# Patient Record
Sex: MALE | Race: OTHER | HISPANIC OR LATINO | URBAN - METROPOLITAN AREA
[De-identification: names, ages, dates, MRNs, and addresses within clinical notes are randomized per-mention and may not be internally consistent; named-entity substitution may affect disease eponyms.]

---

## 2018-02-22 ENCOUNTER — EMERGENCY (EMERGENCY)
Facility: HOSPITAL | Age: 38
LOS: 1 days | Discharge: HOME | End: 2018-02-22
Attending: EMERGENCY MEDICINE | Admitting: EMERGENCY MEDICINE
Payer: OTHER MISCELLANEOUS

## 2018-02-22 VITALS
TEMPERATURE: 98 F | OXYGEN SATURATION: 96 % | SYSTOLIC BLOOD PRESSURE: 124 MMHG | RESPIRATION RATE: 16 BRPM | HEART RATE: 61 BPM | DIASTOLIC BLOOD PRESSURE: 73 MMHG

## 2018-02-22 VITALS
TEMPERATURE: 98 F | DIASTOLIC BLOOD PRESSURE: 80 MMHG | SYSTOLIC BLOOD PRESSURE: 154 MMHG | HEART RATE: 72 BPM | RESPIRATION RATE: 16 BRPM | OXYGEN SATURATION: 99 %
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
## 2018-02-22 DIAGNOSIS — Z23 ENCOUNTER FOR IMMUNIZATION: ICD-10-CM

## 2018-02-22 DIAGNOSIS — S01.81XA LACERATION WITHOUT FOREIGN BODY OF OTHER PART OF HEAD, INITIAL ENCOUNTER: ICD-10-CM
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
## 2018-02-22 PROCEDURE — 12013 RPR F/E/E/N/L/M 2.6-5.0 CM: CPT

## 2018-02-22 PROCEDURE — 99283 EMERGENCY DEPT VISIT LOW MDM: CPT | Mod: 25

## 2018-02-22 RX ORDER — CEPHALEXIN 500 MG
500 CAPSULE ORAL ONCE
Qty: 0 | Refills: 0 | Status: COMPLETED | OUTPATIENT
Start: 2018-02-22 | End: 2018-02-22

## 2018-02-22 RX ORDER — CEPHALEXIN 500 MG
1 CAPSULE ORAL
Qty: 28 | Refills: 0 | OUTPATIENT
Start: 2018-02-22 | End: 2018-02-28

## 2018-02-22 RX ORDER — ACETAMINOPHEN 500 MG
975 TABLET ORAL ONCE
Qty: 0 | Refills: 0 | Status: COMPLETED | OUTPATIENT
Start: 2018-02-22 | End: 2018-02-22

## 2018-02-22 RX ORDER — TETANUS TOXOID, REDUCED DIPHTHERIA TOXOID AND ACELLULAR PERTUSSIS VACCINE, ADSORBED 5; 2.5; 8; 8; 2.5 [IU]/.5ML; [IU]/.5ML; UG/.5ML; UG/.5ML; UG/.5ML
0.5 SUSPENSION INTRAMUSCULAR ONCE
Qty: 0 | Refills: 0 | Status: COMPLETED | OUTPATIENT
Start: 2018-02-22 | End: 2018-02-22

## 2018-02-22 RX ORDER — TRAMADOL HYDROCHLORIDE 50 MG/1
50 TABLET ORAL ONCE
Qty: 0 | Refills: 0 | Status: DISCONTINUED | OUTPATIENT
Start: 2018-02-22 | End: 2018-02-22

## 2018-02-22 RX ORDER — IBUPROFEN 200 MG
1 TABLET ORAL
Qty: 30 | Refills: 0 | OUTPATIENT
Start: 2018-02-22

## 2018-02-22 RX ADMIN — Medication 500 MILLIGRAM(S): at 13:04

## 2018-02-22 RX ADMIN — TRAMADOL HYDROCHLORIDE 50 MILLIGRAM(S): 50 TABLET ORAL at 09:57

## 2018-02-22 RX ADMIN — TETANUS TOXOID, REDUCED DIPHTHERIA TOXOID AND ACELLULAR PERTUSSIS VACCINE, ADSORBED 0.5 MILLILITER(S): 5; 2.5; 8; 8; 2.5 SUSPENSION INTRAMUSCULAR at 13:03

## 2018-02-22 RX ADMIN — TRAMADOL HYDROCHLORIDE 50 MILLIGRAM(S): 50 TABLET ORAL at 13:06

## 2018-02-22 RX ADMIN — Medication 975 MILLIGRAM(S): at 09:57

## 2018-02-22 NOTE — ED PROVIDER NOTE - CONSTITUTIONAL, MLM
normal... Well appearing, well nourished, awake, alert, oriented to person, place, time/situation and holding abdo pad to face

## 2018-02-22 NOTE — ED PROVIDER NOTE - MEDICAL DECISION MAKING DETAILS
Patient presenting with facial lacerations. Td UTD. will need layered closure/flap alignment. Patient presenting with facial lacerations. Td UTD. Will need layered closure/flap alignment- plastics to close deep lac.
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
## 2018-02-22 NOTE — ED PROVIDER NOTE - PROGRESS NOTE DETAILS
Patient now thinks he was confused about his Td. will give booster. Dr. wilkes of plastics closed deep laceration.

## 2018-02-22 NOTE — ED PROVIDER NOTE - SKIN, MLM
~ 1.5 x 1.5 cm ve shaoped chin lac, ~ 3-4mm deep, ~ 7 cm flap like lac ~ 5mm deep extending from L chin to L cheek. N/V intact. No salivary glands exposed.

## 2019-01-14 VITALS
HEIGHT: 65 IN | SYSTOLIC BLOOD PRESSURE: 146 MMHG | RESPIRATION RATE: 16 BRPM | OXYGEN SATURATION: 97 % | TEMPERATURE: 97 F | DIASTOLIC BLOOD PRESSURE: 76 MMHG | WEIGHT: 153.88 LBS | HEART RATE: 64 BPM

## 2019-01-15 ENCOUNTER — INPATIENT (INPATIENT)
Facility: HOSPITAL | Age: 39
LOS: 0 days | Discharge: HOME | DRG: 473 | End: 2019-01-16
Attending: ORTHOPAEDIC SURGERY | Admitting: ORTHOPAEDIC SURGERY
Payer: OTHER MISCELLANEOUS

## 2019-01-15 DIAGNOSIS — M54.12 RADICULOPATHY, CERVICAL REGION: ICD-10-CM

## 2019-01-15 RX ORDER — HYDROMORPHONE HYDROCHLORIDE 2 MG/ML
0.5 INJECTION INTRAMUSCULAR; INTRAVENOUS; SUBCUTANEOUS
Qty: 0 | Refills: 0 | Status: DISCONTINUED | OUTPATIENT
Start: 2019-01-15 | End: 2019-01-16

## 2019-01-15 RX ORDER — OXYCODONE HYDROCHLORIDE 5 MG/1
10 TABLET ORAL EVERY 4 HOURS
Qty: 0 | Refills: 0 | Status: DISCONTINUED | OUTPATIENT
Start: 2019-01-15 | End: 2019-01-16

## 2019-01-15 RX ORDER — OXYCODONE HYDROCHLORIDE 5 MG/1
5 TABLET ORAL EVERY 4 HOURS
Qty: 0 | Refills: 0 | Status: DISCONTINUED | OUTPATIENT
Start: 2019-01-15 | End: 2019-01-16

## 2019-01-15 RX ORDER — FAMOTIDINE 10 MG/ML
20 INJECTION INTRAVENOUS EVERY 12 HOURS
Qty: 0 | Refills: 0 | Status: DISCONTINUED | OUTPATIENT
Start: 2019-01-15 | End: 2019-01-16

## 2019-01-15 RX ORDER — CEFAZOLIN SODIUM 1 G
2000 VIAL (EA) INJECTION EVERY 8 HOURS
Qty: 0 | Refills: 0 | Status: COMPLETED | OUTPATIENT
Start: 2019-01-15 | End: 2019-01-16

## 2019-01-15 RX ORDER — SODIUM CHLORIDE 9 MG/ML
1000 INJECTION, SOLUTION INTRAVENOUS
Qty: 0 | Refills: 0 | Status: DISCONTINUED | OUTPATIENT
Start: 2019-01-15 | End: 2019-01-16

## 2019-01-15 RX ORDER — CEFAZOLIN SODIUM 1 G
2000 VIAL (EA) INJECTION EVERY 8 HOURS
Qty: 0 | Refills: 0 | Status: DISCONTINUED | OUTPATIENT
Start: 2019-01-15 | End: 2019-01-15

## 2019-01-15 RX ORDER — DEXAMETHASONE 0.5 MG/5ML
10 ELIXIR ORAL EVERY 8 HOURS
Qty: 0 | Refills: 0 | Status: COMPLETED | OUTPATIENT
Start: 2019-01-15 | End: 2019-01-16

## 2019-01-15 RX ORDER — ACETAMINOPHEN 500 MG
650 TABLET ORAL EVERY 6 HOURS
Qty: 0 | Refills: 0 | Status: DISCONTINUED | OUTPATIENT
Start: 2019-01-15 | End: 2019-01-16

## 2019-01-15 RX ADMIN — HYDROMORPHONE HYDROCHLORIDE 0.5 MILLIGRAM(S): 2 INJECTION INTRAMUSCULAR; INTRAVENOUS; SUBCUTANEOUS at 17:40

## 2019-01-15 RX ADMIN — FAMOTIDINE 20 MILLIGRAM(S): 10 INJECTION INTRAVENOUS at 21:45

## 2019-01-15 RX ADMIN — HYDROMORPHONE HYDROCHLORIDE 0.5 MILLIGRAM(S): 2 INJECTION INTRAMUSCULAR; INTRAVENOUS; SUBCUTANEOUS at 17:12

## 2019-01-15 RX ADMIN — Medication 2000 MILLIGRAM(S): at 21:45

## 2019-01-15 RX ADMIN — OXYCODONE HYDROCHLORIDE 10 MILLIGRAM(S): 5 TABLET ORAL at 21:44

## 2019-01-15 RX ADMIN — OXYCODONE HYDROCHLORIDE 10 MILLIGRAM(S): 5 TABLET ORAL at 22:40

## 2019-01-15 NOTE — PROGRESS NOTE ADULT - SUBJECTIVE AND OBJECTIVE BOX
Pain Management Consult Note - [facility] [phone]    Chief Complaint: Neck Pain    HPI: Patient seen and examined today, patient PREOP, scheduled for ACDF C5-C6. Patient complains of neck pain that radiates down his R shoulder and arm, patient expresses some tingling to R arm and R hand. Reviewed post op pain medication regimen with patient, patient verbalized understanding      Pain is ___ sharp __x__dull ___burning _x__achy ___ Intensity: ____ mild _x__mod _x__severe     Location ____surgical site __x__cervical _____lumbar ____abd ____upper ext____lower ext    Worse with __x__activity _x___movement _____physical therapy___ Rest    Improved with __x__medication __x__rest ____physical therapy      ROS: Const:  -___febrile   Eyes:___ENT:___CV: __-_chest pain  Resp: __-__sob  GI:_-__nausea _-__vomiting -___abd pain _x__npo ___clears __full diet __bm  :___ Musk: _x__pain __x_spasm  Skin:___ Neuro:  _-__wbftmpet_-__lhklyfocx_k__ numbness _-__weakness __x_paresth  Psych:_-_anxiety  Endo:___ Heme:___Allergy:_________, _x__all others reviewed and negative      PAST MEDICAL & SURGICAL HISTORY:  Cervical radiculopathy  History of kidney stones  No pertinent past medical history  No significant past surgical history      SH: _-__Tobacco   __-_Alcohol                          FH:FAMILY HISTORY:          T(C): --  HR: --  BP: --  RR: --  SpO2: --  Wt(kg): --    T(C): --  HR: --  BP: --  RR: --  SpO2: --  Wt(kg): --    T(C): --  HR: --  BP: --  RR: --  SpO2: --  Wt(kg): --    PHYSICAL EXAM:  Gen Appearance: ___no acute distress _x__appropriate        Neuro: ___SILT feet____ EOM Intact Psych: AAOX_3_, _x_mood/affect appropriate        Eyes: _x__conjunctiva WNL  __x___ Pupils equal and round        ENT: _x__ears and nose atraumatic__x_ Hearing grossly intact        Neck: x___trachea midline, no visible masses ___thyroid without palpable mass    Resp: _x__Nml WOB____No tactile fremitus ___clear to auscultation    Cardio: x___extremities free from edema __x__pedal pulses palpable    GI/Abdomen: _x__soft ___x__ Nontender___x___Nondistended_____HSM    Lymphatic: ___no palpable nodes in neck  ___no palpable nodes calves and feet    Skin/Wound: ___Incision, ___Dressing c/d/i,   ____surrounding tissues soft,  ___drain/chest tube present____    Muscular: EHL _5__/5  Gastrocnemius_5__/5    ___absent clubbing/cyanosis      ASSESSMENT: This is a 38y old Male with a history of cervical radiculopathy, scheuled for ACDF C5-C6.      Recommended Treatment PLAN:  1. Oxycodone 5-10mg PO Q4h prn moderate to severe pain   2. Dilaudid 0.5mg Q2h IVP prn breakthrough pain   Plan discussed with Dr. Justine Engel

## 2019-01-15 NOTE — H&P ADULT - NSHPPHYSICALEXAM_GEN_ALL_CORE
MSK:   Decreased ROM cervical spine secondary to pain.  Remainder of physical exam as per medical clearance note MSK: No rashes, lesions or open wounds noted. Skin warm and well perfused. 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 Sensation intact to bilateral upper extremities although diminished to right upper extremity compared to left upper extremity. Firining biceps/triceps muscles although strength decreased secondary to pain. PIN/AIN/ulnar nerves intact. Decreased ROM cervical spine secondary to pain.  Remainder of physical exam as per medical clearance note

## 2019-01-15 NOTE — H&P ADULT - NSHPREVIEWOFSYSTEMS_GEN_ALL_CORE
MSK: +neck pain, cervical spine  See HPI MSK: +neck pain, cervical spine: +bilateral arm pain   See HPI

## 2019-01-15 NOTE — PROGRESS NOTE ADULT - SUBJECTIVE AND OBJECTIVE BOX
Ortho Post Op Check    Procedure: ACDF C5-C6  Surgeon: Taylor     Pt comfortable without complaints, pain controlled, numbness improving in RUE postoperatively  Denies CP, SOB, N/V, tingling     Vital Signs Last 24 Hrs  T(C): 36.1 (01-15-19 @ 18:00), Max: 36.4 (01-15-19 @ 16:40)  T(F): 97 (01-15-19 @ 18:00), Max: 97.5 (01-15-19 @ 16:40)  HR: 80 (01-15-19 @ 20:00) (66 - 80)  BP: 135/87 (01-15-19 @ 20:00) (135/87 - 152/88)  BP(mean): 108 (01-15-19 @ 19:00) (94 - 108)  RR: 20 (01-15-19 @ 20:00) (10 - 20)  SpO2: 97% (01-15-19 @ 20:00) (97% - 99%)  AVSS    General: Pt Alert and oriented, NAD  DSG C/D/I, HV x 1, hard C collar   Pulses: 2+ rad bilat   Sensation:  SILT C5-T1 bilat (residual numbness in RUE in C5-C8 region)   Motor: 5/5 D/B/T/WE/WF/EPL/FPL/IO bilat       A/P: 38yMale s/p ACDF C5-6  - Stable  - Pain Control  - DVT ppx:  SCDs  - Post op abx: Ancef   - OT, WBS:  WBAT     Ortho Pager [phone]
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
## 2019-01-15 NOTE — H&P ADULT - PROBLEM SELECTOR PLAN 1
Admit to Orthopaedic Service.  Presents today for elective ACDF C5-C6  Pt medically stable and cleared for procedure today by  Admit to Orthopaedic Service.  Presents today for elective ACDF C5-C6  Pt medically stable and cleared for procedure today by Dr. Marcello Vazquez.

## 2019-01-16 VITALS
DIASTOLIC BLOOD PRESSURE: 71 MMHG | OXYGEN SATURATION: 93 % | HEART RATE: 82 BPM | RESPIRATION RATE: 15 BRPM | TEMPERATURE: 98 F | SYSTOLIC BLOOD PRESSURE: 129 MMHG

## 2019-01-16 LAB
ANION GAP SERPL CALC-SCNC: 13 MMOL/L — SIGNIFICANT CHANGE UP (ref 5–17)
BASOPHILS NFR BLD AUTO: 0.1 % — SIGNIFICANT CHANGE UP (ref 0–2)
BUN SERPL-MCNC: 11 MG/DL — SIGNIFICANT CHANGE UP (ref 7–23)
CALCIUM SERPL-MCNC: 9.6 MG/DL — SIGNIFICANT CHANGE UP (ref 8.4–10.5)
CHLORIDE SERPL-SCNC: 98 MMOL/L — SIGNIFICANT CHANGE UP (ref 96–108)
CO2 SERPL-SCNC: 23 MMOL/L — SIGNIFICANT CHANGE UP (ref 22–31)
CREAT SERPL-MCNC: 0.86 MG/DL — SIGNIFICANT CHANGE UP (ref 0.5–1.3)
EOSINOPHIL NFR BLD AUTO: 0 % — SIGNIFICANT CHANGE UP (ref 0–6)
GLUCOSE SERPL-MCNC: 160 MG/DL — HIGH (ref 70–99)
HCT VFR BLD CALC: 41.6 % — SIGNIFICANT CHANGE UP (ref 39–50)
HGB BLD-MCNC: 14.8 G/DL — SIGNIFICANT CHANGE UP (ref 13–17)
LYMPHOCYTES # BLD AUTO: 8.8 % — LOW (ref 13–44)
MCHC RBC-ENTMCNC: 29 PG — SIGNIFICANT CHANGE UP (ref 27–34)
MCHC RBC-ENTMCNC: 35.6 G/DL — SIGNIFICANT CHANGE UP (ref 32–36)
MCV RBC AUTO: 81.6 FL — SIGNIFICANT CHANGE UP (ref 80–100)
MONOCYTES NFR BLD AUTO: 1.3 % — LOW (ref 2–14)
NEUTROPHILS NFR BLD AUTO: 89.8 % — HIGH (ref 43–77)
PLATELET # BLD AUTO: 238 K/UL — SIGNIFICANT CHANGE UP (ref 150–400)
POTASSIUM SERPL-MCNC: 4 MMOL/L — SIGNIFICANT CHANGE UP (ref 3.5–5.3)
POTASSIUM SERPL-SCNC: 4 MMOL/L — SIGNIFICANT CHANGE UP (ref 3.5–5.3)
RBC # BLD: 5.1 M/UL — SIGNIFICANT CHANGE UP (ref 4.2–5.8)
RBC # FLD: 11.8 % — SIGNIFICANT CHANGE UP (ref 10.3–16.9)
SODIUM SERPL-SCNC: 134 MMOL/L — LOW (ref 135–145)
WBC # BLD: 13.8 K/UL — HIGH (ref 3.8–10.5)
WBC # FLD AUTO: 13.8 K/UL — HIGH (ref 3.8–10.5)

## 2019-01-16 PROCEDURE — 88304 TISSUE EXAM BY PATHOLOGIST: CPT

## 2019-01-16 PROCEDURE — 72040 X-RAY EXAM NECK SPINE 2-3 VW: CPT | Mod: 26

## 2019-01-16 PROCEDURE — 76000 FLUOROSCOPY <1 HR PHYS/QHP: CPT

## 2019-01-16 PROCEDURE — 86901 BLOOD TYPING SEROLOGIC RH(D): CPT

## 2019-01-16 PROCEDURE — 99223 1ST HOSP IP/OBS HIGH 75: CPT

## 2019-01-16 PROCEDURE — C1889: CPT

## 2019-01-16 PROCEDURE — 85025 COMPLETE CBC W/AUTO DIFF WBC: CPT

## 2019-01-16 PROCEDURE — 80048 BASIC METABOLIC PNL TOTAL CA: CPT

## 2019-01-16 PROCEDURE — 36415 COLL VENOUS BLD VENIPUNCTURE: CPT

## 2019-01-16 PROCEDURE — 72040 X-RAY EXAM NECK SPINE 2-3 VW: CPT

## 2019-01-16 PROCEDURE — C1713: CPT

## 2019-01-16 PROCEDURE — 86900 BLOOD TYPING SEROLOGIC ABO: CPT

## 2019-01-16 PROCEDURE — 86850 RBC ANTIBODY SCREEN: CPT

## 2019-01-16 PROCEDURE — 95940 IONM IN OPERATNG ROOM 15 MIN: CPT

## 2019-01-16 RX ORDER — DIAZEPAM 5 MG
0.5 TABLET ORAL
Qty: 1.5 | Refills: 0 | OUTPATIENT
Start: 2019-01-16 | End: 2019-01-18

## 2019-01-16 RX ORDER — SENNA PLUS 8.6 MG/1
2 TABLET ORAL
Qty: 0 | Refills: 0 | COMMUNITY

## 2019-01-16 RX ORDER — DOCUSATE SODIUM 100 MG
1 CAPSULE ORAL
Qty: 0 | Refills: 0 | COMMUNITY

## 2019-01-16 RX ORDER — ACETAMINOPHEN 500 MG
2 TABLET ORAL
Qty: 0 | Refills: 0 | COMMUNITY
Start: 2019-01-16

## 2019-01-16 RX ORDER — ACETAMINOPHEN 500 MG
650 TABLET ORAL EVERY 6 HOURS
Qty: 0 | Refills: 0 | Status: DISCONTINUED | OUTPATIENT
Start: 2019-01-16 | End: 2019-01-16

## 2019-01-16 RX ORDER — OXYCODONE HYDROCHLORIDE 5 MG/1
1 TABLET ORAL
Qty: 25 | Refills: 0 | OUTPATIENT
Start: 2019-01-16 | End: 2019-01-20

## 2019-01-16 RX ORDER — DIAZEPAM 5 MG
2.5 TABLET ORAL ONCE
Qty: 0 | Refills: 0 | Status: DISCONTINUED | OUTPATIENT
Start: 2019-01-16 | End: 2019-01-16

## 2019-01-16 RX ADMIN — HYDROMORPHONE HYDROCHLORIDE 0.5 MILLIGRAM(S): 2 INJECTION INTRAMUSCULAR; INTRAVENOUS; SUBCUTANEOUS at 09:15

## 2019-01-16 RX ADMIN — Medication 2000 MILLIGRAM(S): at 05:21

## 2019-01-16 RX ADMIN — OXYCODONE HYDROCHLORIDE 10 MILLIGRAM(S): 5 TABLET ORAL at 05:53

## 2019-01-16 RX ADMIN — Medication 102 MILLIGRAM(S): at 05:21

## 2019-01-16 RX ADMIN — Medication 102 MILLIGRAM(S): at 00:51

## 2019-01-16 RX ADMIN — SODIUM CHLORIDE 100 MILLILITER(S): 9 INJECTION, SOLUTION INTRAVENOUS at 04:55

## 2019-01-16 RX ADMIN — HYDROMORPHONE HYDROCHLORIDE 0.5 MILLIGRAM(S): 2 INJECTION INTRAMUSCULAR; INTRAVENOUS; SUBCUTANEOUS at 08:26

## 2019-01-16 RX ADMIN — Medication 2.5 MILLIGRAM(S): at 12:19

## 2019-01-16 RX ADMIN — OXYCODONE HYDROCHLORIDE 10 MILLIGRAM(S): 5 TABLET ORAL at 04:55

## 2019-01-16 RX ADMIN — HYDROMORPHONE HYDROCHLORIDE 0.5 MILLIGRAM(S): 2 INJECTION INTRAMUSCULAR; INTRAVENOUS; SUBCUTANEOUS at 01:10

## 2019-01-16 RX ADMIN — HYDROMORPHONE HYDROCHLORIDE 0.5 MILLIGRAM(S): 2 INJECTION INTRAMUSCULAR; INTRAVENOUS; SUBCUTANEOUS at 00:54

## 2019-01-16 NOTE — CONSULT NOTE ADULT - ASSESSMENT
37yo M, of nephrolithiases and chronic neck pain with cervical radiculopathy that failed outpatient conservative therapy. Presented for chronic neck pain. Admitted for elective ACDF C5-C6. Medicine consulted for comanagement.     1) Cervical radiculopathy  S/p ACDF yesterday, now POD-1  * Care per Ortho    2) Post-op state.   * OOB-C  * Ambulatory.   * Pain is controlled.   * Bowel regimen.     3) Ppx.   * Ambulatory.

## 2019-01-16 NOTE — DISCHARGE NOTE ADULT - HOSPITAL COURSE
Admitted  Surgery - underwent ACDF  Sarah-op Antibiotics  Pain control  DVT prophylaxis  OOB/Physical Therapy  Consults: Pain Mgmt

## 2019-01-16 NOTE — CONSULT NOTE ADULT - SUBJECTIVE AND OBJECTIVE BOX
CC: Patient feeling well. Denies pain.   Able to swallow solids.   Denies cp, sob, dizziness.   Rest of ROS negative.    Vital Signs Last 24 Hrs  T(C): 36.7 (16 Jan 2019 08:05), Max: 36.8 (15 Giovanni 2019 21:23)  T(F): 98 (16 Jan 2019 08:05), Max: 98.3 (15 Giovanni 2019 21:23)  HR: 82 (16 Jan 2019 08:05) (66 - 87)  BP: 129/71 (16 Jan 2019 08:05) (129/71 - 152/88)  BP(mean): 108 (15 Giovanni 2019 19:00) (94 - 108)  RR: 15 (16 Jan 2019 08:05) (10 - 20)  SpO2: 93% (16 Jan 2019 08:05) (93% - 99%)    PHYSICAL EXAMINATION  * General: Not in acute distress. Awake and alert. Lying comfortably in bed.  * Head: Normocephalic, atraumatic.  * HEENT: ears no discharge, eyes PERRLA, nose no discharge, throat no exudates, normal tonsils.  * Neck: no JVD, supple.  * Lungs: Clear to auscultation, no rales, no wheezes.  * Cardio: Regular rate and rhythm, no murmurs, no rubs, no gallops. Good peripheral pulses.  * Abdomen: Soft, non-tender, non-distended, tympanic to percussion, no rebound, no guarding, no rigidity. Bowel sounds present. No suprapubic or CVA tenderness.  * : Deferred.  * Extremities: Acyanotic, no edema.  * Skin: Warm and dry.  * Neuro: Alert and oriented x 3. No focal deficits. Motor strength is 5/5 throughout. Sensation intact. Cranial nerves II-XII grossly intact.                           14.8   13.8  )-----------( 238      ( 16 Jan 2019 06:28 )             41.6   01-16    134<L>  |  98  |  11  ----------------------------<  160<H>  4.0   |  23  |  0.86    Ca    9.6      16 Jan 2019 06:28    MEDICATIONS  (STANDING):  dexamethasone  IVPB 10 milliGRAM(s) IV Intermittent every 8 hours  lactated ringers. 1000 milliLiter(s) (100 mL/Hr) IV Continuous <Continuous>    MEDICATIONS  (PRN):  acetaminophen   Tablet .. 650 milliGRAM(s) Oral every 6 hours PRN Temp greater or equal to 38C (100.4F), Mild Pain (1 - 3)  famotidine    Tablet 20 milliGRAM(s) Oral every 12 hours PRN Dyspepsia  HYDROmorphone  Injectable 0.5 milliGRAM(s) IV Push every 2 hours PRN breakthrough pain  oxyCODONE    IR 5 milliGRAM(s) Oral every 4 hours PRN Moderate Pain (4 - 6)  oxyCODONE    IR 10 milliGRAM(s) Oral every 4 hours PRN Severe Pain (7 - 10)

## 2019-01-16 NOTE — DISCHARGE NOTE ADULT - ADDITIONAL INSTRUCTIONS
**Your medications have been sent to [facility], which is on the first floor of [facility], please  at the time of discharge.     No strenuous activity (bending/twisting), heavy lifting, driving or returning to work until cleared by MD.  Change dressing daily with gauze/tape or medipore dressing until post-op day #5, then leave incision open to air. You may shower post-op day#5, keep incision clean and dry. Please keep the collar on until otherwise advised by Dr. Vazquez/Dr. Brenner. You may take the collar off to shower. May apply ice to affected areas to decrease swelling.    Try to have regular bowel movements, take stool softener or laxative if necessary. May take pepcid or zantac for upset stomach.    Call to schedule an appointment with Dr. Vazquez/Dr. Brenner for follow up, if you have staples or sutures they will be removed in office.    Contact your doctor if you experience: fever greater than 101.5, chills, chest pain, difficulty breathing, redness or excessive drainage around the incision, other concerns.    Follow up with your Primary Care Provider. **Your pain medication has been sent to [facility], which is on the first floor of [facility], please  at the time of discharge.     No strenuous activity (bending/twisting), heavy lifting, driving or returning to work until cleared by MD.  Change dressing daily with gauze/tape or medipore dressing until post-op day #5, then leave incision open to air. You may shower post-op day#5, keep incision clean and dry. Please keep the collar on until otherwise advised by Dr. Vazquez/Dr. Brenner. You may take the collar off to shower. May apply ice to affected areas to decrease swelling.    Try to have regular bowel movements, take stool softener or laxative if necessary. May take pepcid or zantac for upset stomach.    Call to schedule an appointment with Dr. Vazquez/Dr. Brenner for follow up, if you have staples or sutures they will be removed in office.    Contact your doctor if you experience: fever greater than 101.5, chills, chest pain, difficulty breathing, redness or excessive drainage around the incision, other concerns.    Follow up with your Primary Care Provider.

## 2019-01-16 NOTE — DISCHARGE NOTE ADULT - MEDICATION SUMMARY - MEDICATIONS TO TAKE
I will START or STAY ON the medications listed below when I get home from the hospital:    [place] J s/p ACDF  -- Indication: For Neck stability    acetaminophen 325 mg oral tablet  -- 2 tab(s) by mouth every 6 hours, As needed for mild pain (1-3) or fever (temperature greater than 100.4F), max 3000mg daily  -- Indication: For Mild pain or fever - please purchase over the counter     oxyCODONE 5 mg oral tablet  -- 1 tab (5mg) po q6h prn moderate pain (4-6) or 2 tabs (10mg) po q6h prn severe pain (7-10), wean off as allan, MDD:5 tabs  -- Indication: For Moderate to severe pain    Colace 100 mg oral capsule  -- 1 cap(s) by mouth 3 times a day, As Needed for constipation  -- Indication: For Constipation - please purchase over the counter    Senna  -- 2 tab(s) by mouth once a day (at bedtime), As Needed for constipation  -- Indication: For Constipation - please purchase over the counter    Cepacol Anesthetic  -- 1 lozenge by mouth every 4 hours, As Needed for sore throat   -- Indication: For Sore throat - please purchase over the counter I will START or STAY ON the medications listed below when I get home from the hospital:    [place] J s/p ACDF  -- Indication: For Neck stability    acetaminophen 325 mg oral tablet  -- 2 tab(s) by mouth every 6 hours, As needed for mild pain (1-3) or fever (temperature greater than 100.4F), max 3000mg daily  -- Indication: For Mild pain or fever - please purchase over the counter     oxyCODONE 5 mg oral tablet  -- 1 tab (5mg) po q6h prn moderate pain (4-6) or 2 tabs (10mg) po q6h prn severe pain (7-10), wean off as allan, MDD:5 tabs  -- Indication: For Moderate to severe pain    diazePAM 5 mg oral tablet  -- 0.5 tab(s) by mouth once a day (at bedtime), As Needed for muscle spasm, MDD:0.5 tab  -- Caution federal law prohibits the transfer of this drug to any person other  than the person for whom it was prescribed.  Do not take this drug if you are pregnant.  May cause drowsiness.  Alcohol may intensify this effect.  Use care when operating dangerous machinery.    -- Indication: For Muscle spasm    Colace 100 mg oral capsule  -- 1 cap(s) by mouth 3 times a day, As Needed for constipation  -- Indication: For Constipation - please purchase over the counter    Senna  -- 2 tab(s) by mouth once a day (at bedtime), As Needed for constipation  -- Indication: For Constipation - please purchase over the counter    Cepacol Anesthetic  -- 1 lozenge by mouth every 4 hours, As Needed for sore throat   -- Indication: For Sore throat - please purchase over the counter

## 2019-01-16 NOTE — PROGRESS NOTE ADULT - SUBJECTIVE AND OBJECTIVE BOX
Pain Management Progress Note - [facility] [phone]    HPI: Patient seen and examined today, patient PREOP, s/p  ACDF C5-C6. Patient complains of neck pain that radiates down his R shoulder and arm, expresses pain relief with Dilaudid PO. Patient Axox3, denies secondary side effects, dressing c,d,i.      Pain is ___ sharp __x__dull ___burning _x__achy ___ Intensity: ____ mild _x__mod _x__severe     Location __x__surgical site __x__cervical _____lumbar ____abd ____upper ext____lower ext    Worse with __x__activity _x___movement _____physical therapy___ Rest    Improved with __x__medication __x__rest ____physical therapy      oxyCODONE    IR  oxyCODONE    IR  HYDROmorphone  Injectable  lactated ringers.  acetaminophen   Tablet ..  dexamethasone  IVPB  famotidine    Tablet  ceFAZolin   IVPB  ceFAZolin  Injectable.  acetaminophen   Tablet ..  diazepam    Tablet      ROS: Const:  -___febrile   Eyes:___ENT:___CV: __-_chest pain  Resp: __-__sob  GI:_-__nausea _-__vomiting -___abd pain ___npo ___clears _x_full diet __bm  :___ Musk: _x__pain __x_spasm  Skin:___ Neuro:  _-__geytlsnj_-__zhsstuobn_a__ numbness _-__weakness __x_paresth  Psych:_-_anxiety  Endo:___ Heme:___Allergy:_________, _x__all others reviewed and negative      PAST MEDICAL & SURGICAL HISTORY:  Cervical radiculopathy  History of kidney stones  No pertinent past medical history  No significant past surgical history    01-16 @ 06:42880 mL/min/1.73M2      Hemoglobin: 14.8 g/dL (01-16 @ 06:28)      T(C): 36.7 (01-16-19 @ 08:05), Max: 36.8 (01-15-19 @ 21:23)  HR: 82 (01-16-19 @ 08:05) (66 - 87)  BP: 129/71 (01-16-19 @ 08:05) (129/71 - 152/88)  RR: 15 (01-16-19 @ 08:05) (10 - 20)  SpO2: 93% (01-16-19 @ 08:05) (93% - 99%)  Wt(kg): --       PHYSICAL EXAM:  Gen Appearance: _x__no acute distress _x__appropriate        Neuro: ___SILT feet____ EOM Intact Psych: AAOX_3_, _x_mood/affect appropriate        Eyes: _x__conjunctiva WNL  __x___ Pupils equal and round        ENT: _x__ears and nose atraumatic__x_ Hearing grossly intact        Neck: x___trachea midline, no visible masses ___thyroid without palpable mass    Resp: _x__Nml WOB____No tactile fremitus ___clear to auscultation    Cardio: x___extremities free from edema __x__pedal pulses palpable    GI/Abdomen: _x__soft ___x__ Nontender___x___Nondistended_____HSM    Lymphatic: ___no palpable nodes in neck  ___no palpable nodes calves and feet    Skin/Wound: ___Incision, x___Dressing c/d/i,   ____surrounding tissues soft,  _x__drain/chest tube present____    Muscular: EHL _5__/5  Gastrocnemius_5__/5    ___absent clubbing/cyanosis      ASSESSMENT: This is a 38y old Male with a history of cervical radiculopathy, scheuled for ACDF C5-C6, post op day 1, pain managed with current pain medication regimen.      Recommended Treatment PLAN:  1. Oxycodone 5-10mg PO Q4h prn moderate to severe pain   2. Dilaudid 0.5mg Q2h IVP prn breakthrough pain   Plan discussed with Dr. Justine Engel

## 2019-01-16 NOTE — PROGRESS NOTE ADULT - SUBJECTIVE AND OBJECTIVE BOX
pt seen and examined nad states feels better then preop     pe dressing cdi  nvi   power 5/5   sensation grossly intact  no calf tenderness  brisk cap refill    imp sp acdf  plan   ambulation  pt  pain control  scd   xrays

## 2019-01-16 NOTE — DISCHARGE NOTE ADULT - CARE PROVIDER_API CALL
Braulio Vazquez (DO), Orthopaedic Surgery  [address] [place], [place] [address]  Phone: [phone]  Fax: [phone]

## 2019-01-16 NOTE — DISCHARGE NOTE ADULT - PATIENT PORTAL LINK FT
You can access the [facility] Patient Portal, offered by [facility], by registering with the following website: [email or web address]

## 2019-01-16 NOTE — CONSULT NOTE ADULT - CONSULT REASON
Comanagement    37yo M, of nephrolithiases and chronic neck pain with cervical radiculopathy that failed outpatient conservative therapy. Presented for chronic neck pain. Admitted for elective ACDF C5-C6. Medicine consulted for comanagement.     PAST MEDICAL & SURGICAL HISTORY:  Cervical radiculopathy  History of kidney stones  No pertinent past medical history  No significant past surgical history    Social: Denies etoh, smoking, drugs.     Allergies: NKDA.

## 2019-01-18 DIAGNOSIS — F17.210 NICOTINE DEPENDENCE, CIGARETTES, UNCOMPLICATED: ICD-10-CM

## 2019-01-18 DIAGNOSIS — M54.12 RADICULOPATHY, CERVICAL REGION: ICD-10-CM

## 2019-01-18 DIAGNOSIS — M50.122 CERVICAL DISC DISORDER AT C5-C6 LEVEL WITH RADICULOPATHY: ICD-10-CM

## 2019-01-18 DIAGNOSIS — Z87.442 PERSONAL HISTORY OF URINARY CALCULI: ICD-10-CM

## 2019-01-19 LAB — SURGICAL PATHOLOGY STUDY: SIGNIFICANT CHANGE UP

## 2021-10-29 ENCOUNTER — EMERGENCY (EMERGENCY)
Facility: HOSPITAL | Age: 41
LOS: 1 days | Discharge: HOME | End: 2021-10-29
Admitting: EMERGENCY MEDICINE
Payer: OTHER MISCELLANEOUS

## 2021-10-29 VITALS
RESPIRATION RATE: 18 BRPM | HEIGHT: 65 IN | DIASTOLIC BLOOD PRESSURE: 80 MMHG | SYSTOLIC BLOOD PRESSURE: 152 MMHG | WEIGHT: 154.98 LBS | TEMPERATURE: 98 F | OXYGEN SATURATION: 99 % | HEART RATE: 62 BPM

## 2021-10-29 DIAGNOSIS — H57.89 OTHER SPECIFIED DISORDERS OF EYE AND ADNEXA: ICD-10-CM

## 2021-10-29 DIAGNOSIS — Z87.442 PERSONAL HISTORY OF URINARY CALCULI: ICD-10-CM

## 2021-10-29 PROBLEM — M54.12 RADICULOPATHY, CERVICAL REGION: Chronic | Status: ACTIVE | Noted: 2019-01-14

## 2021-10-29 PROCEDURE — 99282 EMERGENCY DEPT VISIT SF MDM: CPT

## 2021-10-29 PROCEDURE — 99283 EMERGENCY DEPT VISIT LOW MDM: CPT

## 2021-10-29 RX ORDER — ERYTHROMYCIN BASE 5 MG/GRAM
1 OINTMENT (GRAM) OPHTHALMIC (EYE)
Qty: 1 | Refills: 0
Start: 2021-10-29 | End: 2021-11-04

## 2021-10-29 NOTE — ED PROVIDER NOTE - PATIENT PORTAL LINK FT
You can access the FollowMyHealth Patient Portal offered by [facility] by registering at the following website: [email or web address] By joining [employer] [employer]’s FollowMyHealth portal, you will also be able to view your health information using other applications (apps) compatible with our system.

## 2021-10-29 NOTE — ED PROVIDER NOTE - EYES, MLM
Clear bilaterally, pupils equal, round and reactive to light. OS w/slight conjunctival injection and erythema to upper lid, no fb, no abrasion, VA intact (seen rn notes)

## 2021-10-29 NOTE — ED PROVIDER NOTE - CLINICAL SUMMARY MEDICAL DECISION MAKING FREE TEXT BOX
pt c/o l eye redness and lid swelling x 2 d, no discharge, VA intact, no fb/abrasion, ? conjunctivitis vs blepharitis, will tx w/eye oint, to f/u w/optho, pt understands and agrees w/plan, strict return precautions given

## 2021-10-29 NOTE — ED PROVIDER NOTE - NSFOLLOWUPCLINICS_GEN_ALL_ED_FT
[facility]  Ophthalmology  [address] [place] [address]  Phone: [phone]  Fax:

## 2021-10-29 NOTE — ED PROVIDER NOTE - OBJECTIVE STATEMENT
The pt is a 41 y/o M, who presents to ED c/o L eye redness and pain x 2 d. Pt states sensitive to light, has not taken pain meds. no dc from eye. Denies visual changes, discharge from eye, trauma, ha, fever.

## 2021-10-29 NOTE — ED ADULT NURSE NOTE - NSICDXPASTMEDICALHX_GEN_ALL_CORE_FT
PAST MEDICAL HISTORY:  Cervical radiculopathy     History of kidney stones     No pertinent past medical history

## 2021-10-29 NOTE — ED PROVIDER NOTE - NSFOLLOWUPINSTRUCTIONS_ED_ALL_ED_FT
use las gotas para los ojos según lo prescrito, poornima un seguimiento con la clínica oftalmológica en 2-3 días para addis nueva evaluación, no se frote los ojos, aplique compresas frías según sea necesario, regrese a la rachel de emergencias por cualquier síntoma xavier o que empeore  Conjuntivitis  LO QUE NECESITA SABER:  La conjuntivitis es la inflamación de la conjuntiva. La conjuntiva es el tejido white que cubre la parte frontal de miller syed y la parte posterior de david párpados. La conjuntiva ayuda a proteger miller syed y a mantenerlo húmedo. La conjuntivitis podría ser a causa de addis bacteria, alergias o de un virus. Si miller conjuntivitis es a causa de addis bacteria, podría mejorar por sí meet en aproximadamente 7 bruno. La conjuntivitis viral puede durar hasta 3 semanas.  INSTRUCCIONES SOBRE EL [facility]:  Regrese a la rachel de emergencias si:  •Miller dolor de syed empeora.  •La inflamación en david ojos empeora, aún después del tratamiento.  •Miller visión empeora repentinamente o usted no puede azra en lo absoluto.  Comuníquese con miller médico si:  •Usted presenta fiebre o dolor de oído.  •Usted tiene bultos o manchas de jolene pequeñas en miller syed.  •Usted tiene preguntas o inquietudes acerca de miller condición o cuidado  El manejo de david síntomas:  •Aplique addis compresa fría.Moje addis toalla con agua fría y colóquela sobre miller syed. [place] ayuda a disminuir la comezón e irritación.  •No use lentes de contacto.Ellos podrían irritar david ojos. Deseche el par que está usando y pregunte cuándo puede usarlos otra vez. Use un par de lentes nuevos cuando miller médico lo autorice.  •Evite los irritantes.Aléjese de las áreas llenas de humo. Proteja david ojos del aire y del sol.  •Enjuague miller syed.Es posible que necesite enjuagar miller syed con solución salina para ayudar a disminuir david síntomas. Pida más información sobre cómo enjuagar miller syed.  Medicamentos:El tratamiento depende de lo que está provocando la conjuntivitis. A usted  podrían  darle alguno de lo siguientes:  •Medicamentos para la alergiaayuda a disminuir la comezón, el enrojecimiento y la inflamación de david ojos a causa de las alergias. Se lo podrían tracee en forma de píldora, gotas para los ojos o atomizador nasal.  •Los antibióticospodrían ser necesarios si miller conjuntivitis es a causa de addis bacteria. Anahi medicamento podría ser en forma de píldora, gotas o pomada para los ojos.  •[place] david medicamentos romeo se le haya indicado.Consulte con miller médico si usted denisse que miller medicamento no le está ayudando o si presenta efectos secundarios. Infórmele si es alérgico a cualquier medicamento. Mantenga addis lista actualizada de los medicamentos, las vitaminas y los productos herbales que jens. Incluya los siguientes datos de los medicamentos: cantidad, frecuencia y motivo de administración. Traiga con usted la lista o los envases de las píldoras a david citas de seguimiento. Lleve la lista de los medicamentos con usted en diego de addis emergencia.  Evite la propagación de la conjuntivitis:  •Lávese david juan manuel con jabón y agua con frecuencia.Lávese las juan manuel antes y después de tocarse los ojos. También lávese david juan manuel antes de preparar o comer alimentos y después de usar el baño o cambiar un pañal.  •Evite los alérgenos.Trate de evitar las cosas que le provoquen alergias, romeo las mascotas, polvo o pasto.  •Evite el contacto con otras personas.No comparta las toallas o paños. Trate de permanecer lejos de otras personas tanto romeo sea posible. Pregunte cuándo puede regresar al trabajo o a clase.    •Deseche el maquillaje de ojos.La bacteria que provoca la conjuntivitis puede estar en el maquillaje de ojos. Deseche el rimel y otros maquillajes de ojos.

## 2022-08-13 NOTE — PATIENT PROFILE ADULT - REASON FOR REFUSAL (REFER PATIENT TO HEALTHCARE PROVIDER FOR FOLLOW-UP):
Weigh yourself daily in the Morning. Record weights and date in a diary. Call if you have a weight gain of 3lbs in a day and/or 5-7lbs in 1 week. Call if you start noticing increased shortness of breath and/or swelling. Limit salt intake to 2g (2,000mg) / day. Limit fluid intake to 64oz / day (this includes all liquids; coffee, water, soup, popsicle, ect.). [facility] Hotline: [phone]    Monitor home blood pressure. Goal is less than 140/80 but to remain greater than 95/50. Heart rate to remain >55 beats per minute. Create a log and bring to office visits. Coronary Angiogram: About This Test  What is a coronary angiogram?     A coronary angiogram is a test to look at the large blood vessels of your heart (coronary arteries). These blood vessels feed blood, oxygen, and nutrients to your heart. Why is this test done? This test is done to check blood flow in your coronary arteries. It can show the size and location of narrowed or blocked sections of an artery. It's done for people who have coronary artery disease, also known as heart disease. The test can show how serious the disease is and how best to treat it. Or it can be done for people who have symptoms of heart disease to find out if there is a problem with the artery. The Doctor can look at the shape of your heart, the motion of the heart, and valves in the heart. What happens during the test?  You will get medicine to help you relax. A thin tube called a catheter is put into a blood vessel in your groin or arm. You will get a shot to numb the skin where the catheter goes in. You may feel pressure when the doctor moves the catheter through your blood vessel into your heart. Dye is put into your coronary arteries through the catheter. Your doctor can see the dye as it moves through the arteries. This lets your doctor look for areas that are narrowed or blocked. You may feel hot or flushed for several seconds when the dye is put in.   How long does it take? The test will take about 30 minutes to an hour. But you need time to get ready for it and time to recover. If a problem is found and the doctor treats it, it can take a few hours longer. Care of your puncture site:  Remove bandage 24 hours after the procedure. May shower in 24 hours but do not sit in a bathtub/pool of water for 5 days or until the wound is healed. Inspect the site daily and gently clean using soap and water while standing in the shower. Dry thoroughly and apply a Band-Aid that covers the entire site. Do not apply powder or lotion. Normal Observations:  Soreness or tenderness which may last one week. Mild oozing from the incision site. Possible bruising that could last 2 weeks. Activity:  You may resume driving 24 hours following the procedure. You may resume normal activity in 5 days or after the wound heals. Avoid lifting more than 10 pounds for 5 days or until the wound heals. Avoid strenuous exercise or activity for 1 week. Nutrition:  Regular diet. Drink at least 8 to 10 glasses of decaffeinated, non-alcoholic fluid for the next 24 hours to flush the x-ray dye used for your angiogram out of your body. Call your doctor immediately if your condition worsens, for any other concerns, for a follow-up appointment or if you experience any of the following:  Significant bleeding that does not stop after 10 minutes of applying firm pressure on the puncture site. Increased swelling on the groin or leg. Unusual pain, numbness, or tingling of the groin or down the leg. Any signs of infection such as: redness, yellow drainage at the site, swelling or pain. Other Instructions:  Hold Metformin or Metformin containing drugs for 48 hours after procedure. PT refuses

## 2022-11-07 NOTE — ED ADULT NURSE NOTE - PRIMARY CARE PROVIDER
Chief Complaint:   Chief Complaint   Patient presents with   • Rash     6       History of Present Illness: Ree Coyne is a 24 year old male who presents today with rash.  Patient reports he first noticed it 1 week ago.  Describes itchy, bumpy rash over his entire body.  He states he is required to wear a shirt provided by his employer at work.  He typically wears something under this, but reports he was very hot at work 1 week ago and took off his undershirt.  He then noted a red rash on his back, chest/abdomen, and arms.  When he got home, he showered and then noticed rash on his legs and feet.  He says his girlfriend and several other coworkers developed a similar rash.  He was worried about scabies and started using a lotion with tea tree oil and now feels the rash is improving.  He still has some rash on his feet, legs, back, and hands.  He still reports feeling itchy.  No fever or chills.  No sore throat or recent URI symptoms.  Patient denies any exposure to new medications or products.  He is anxious about scabies and interested in treatment.  No bruising or bleeding issues.  No blood thinners.  Patient also mentions concern that his scalp is itchy.    ROS: Pertinent positive and negative review of systems detailed in HPI.    ALLERGIES:  No Known Allergies    Medications:   Current Outpatient Medications   Medication Sig Dispense Refill   None  No current facility-administered medications for this visit.       Histories:  History reviewed. No pertinent past medical history.    History reviewed. No pertinent surgical history.    Social History:  reports that he has never smoked. He does not have any smokeless tobacco history on file.    Physical Exam:   Vital Signs:   Visit Vitals  /67 (BP Location: RUE - Right upper extremity, Patient Position: Sitting)   Pulse 77   Temp 98 °F (36.7 °C) (Temporal)   Resp 14   Ht 5' 7\" (1.702 m)   Wt 62.4 kg (137 lb 7.3 oz)   SpO2 100%   BMI 21.53 kg/m²     Gen:  Alert and oriented x 3, no acute distress.  HEENT: normal conjunctiva, oral MMM, normal scalp, no visible lice, nits, or rash  Neck: Supple, trachea midline, no cervical LAD  DERM: blanching fine erythematous papular rash noted on back, lower legs, dorsum of feet has a faint nonblanching erythematous rash that looks petechial, few scattered papules between fingers on hands, no bruising, skin intact, no vesicles or pustules          Assessment:  1. Rash and nonspecific skin eruption        Plan:   Patient presents with rash, etiology not certain  Possible heat rash, contact dermatitis, scabies possible, although rash not classic  Rash on feet looks more petechial, but very mild, possibly from itching, doubt vasculitis or platelet issue  No known exposure to new medication, possible reaction to detergent used in work shirt  Patient instructed to always wear an undershirt at work, limit time in sweaty clothes  Permethrin 5% x 1 dose to cover scabies given reports of contacts with similar rash  Cetirizine for itch relief    Orders Placed This Encounter   • permethrin (ACTICIN) 5 % cream   • cetirizine (ZyrTEC) 10 MG tablet       Follow-Up: Recheck with PCP if not improving over the next 1-2 weeks.  If rapid worsening/spreading, should be reevaluated right away.    After visit summary was reviewed with the patient.  All questions were answered.  Patient verbalized an understanding of the diagnosis and treatment plan.   none

## 2023-01-11 NOTE — ED PROVIDER NOTE - CROS ED ROS STATEMENT
all other ROS negative except as per HPI Hpi Title: Evaluation of Skin Lesions How Severe Are Your Spot(S)?: moderate Have Your Spot(S) Been Treated In The Past?: has not been treated

## 2023-01-18 NOTE — ED ADULT NURSE NOTE - NSHISCREENINGQ1_ED_A_ED
The patient is Stable - Low risk of patient condition declining or worsening    Shift Goals  Clinical Goals: DC tx plan, skin care, pain control  Patient Goals: pain control  Family Goals: No family present    Progress made toward(s) clinical / shift goals:  awaiting SNF bed. Assisting pt with turns Q2h.  Dressings changed Qshift.  Medicating for pain PRN per MAR.    Patient is not progressing towards the following goals:       No

## 2024-06-14 NOTE — ED PROCEDURE NOTE - NS ED PROC PERFORMED BY1 FT
Sent a message to the patient.  The Daughter-in-law never called back.  Sent an e-advice to have them hopefully respond back before the end of the day today.   T. Graeme
